# Patient Record
Sex: FEMALE | Race: WHITE | NOT HISPANIC OR LATINO
[De-identification: names, ages, dates, MRNs, and addresses within clinical notes are randomized per-mention and may not be internally consistent; named-entity substitution may affect disease eponyms.]

---

## 2021-01-27 ENCOUNTER — APPOINTMENT (OUTPATIENT)
Dept: ANTEPARTUM | Facility: CLINIC | Age: 31
End: 2021-01-27
Payer: COMMERCIAL

## 2021-01-27 ENCOUNTER — TRANSCRIPTION ENCOUNTER (OUTPATIENT)
Age: 31
End: 2021-01-27

## 2021-01-27 ENCOUNTER — ASOB RESULT (OUTPATIENT)
Age: 31
End: 2021-01-27

## 2021-01-27 PROCEDURE — 99072 ADDL SUPL MATRL&STAF TM PHE: CPT

## 2021-01-27 PROCEDURE — 76801 OB US < 14 WKS SINGLE FETUS: CPT

## 2021-01-27 PROCEDURE — 76813 OB US NUCHAL MEAS 1 GEST: CPT | Mod: 59

## 2021-03-24 ENCOUNTER — ASOB RESULT (OUTPATIENT)
Age: 31
End: 2021-03-24

## 2021-03-24 ENCOUNTER — APPOINTMENT (OUTPATIENT)
Dept: ANTEPARTUM | Facility: CLINIC | Age: 31
End: 2021-03-24
Payer: COMMERCIAL

## 2021-03-24 PROBLEM — Z00.00 ENCOUNTER FOR PREVENTIVE HEALTH EXAMINATION: Status: ACTIVE | Noted: 2021-03-24

## 2021-03-24 PROCEDURE — 76805 OB US >/= 14 WKS SNGL FETUS: CPT

## 2021-03-24 PROCEDURE — 99072 ADDL SUPL MATRL&STAF TM PHE: CPT

## 2021-07-28 ENCOUNTER — OUTPATIENT (OUTPATIENT)
Dept: OUTPATIENT SERVICES | Facility: HOSPITAL | Age: 31
LOS: 1 days | End: 2021-07-28

## 2021-07-28 VITALS
WEIGHT: 177.91 LBS | RESPIRATION RATE: 16 BRPM | DIASTOLIC BLOOD PRESSURE: 70 MMHG | TEMPERATURE: 97 F | SYSTOLIC BLOOD PRESSURE: 100 MMHG | HEART RATE: 83 BPM | HEIGHT: 65 IN

## 2021-07-28 DIAGNOSIS — Z34.90 ENCOUNTER FOR SUPERVISION OF NORMAL PREGNANCY, UNSPECIFIED, UNSPECIFIED TRIMESTER: ICD-10-CM

## 2021-07-28 DIAGNOSIS — Z98.890 OTHER SPECIFIED POSTPROCEDURAL STATES: Chronic | ICD-10-CM

## 2021-07-28 DIAGNOSIS — Z98.891 HISTORY OF UTERINE SCAR FROM PREVIOUS SURGERY: Chronic | ICD-10-CM

## 2021-07-28 LAB
APPEARANCE UR: CLEAR — SIGNIFICANT CHANGE UP
BILIRUB UR-MCNC: NEGATIVE — SIGNIFICANT CHANGE UP
BLD GP AB SCN SERPL QL: NEGATIVE — SIGNIFICANT CHANGE UP
COLOR SPEC: SIGNIFICANT CHANGE UP
DIFF PNL FLD: NEGATIVE — SIGNIFICANT CHANGE UP
GLUCOSE UR QL: NEGATIVE — SIGNIFICANT CHANGE UP
HCT VFR BLD CALC: 35.4 % — SIGNIFICANT CHANGE UP (ref 34.5–45)
HGB BLD-MCNC: 11.3 G/DL — LOW (ref 11.5–15.5)
KETONES UR-MCNC: NEGATIVE — SIGNIFICANT CHANGE UP
LEUKOCYTE ESTERASE UR-ACNC: ABNORMAL
MCHC RBC-ENTMCNC: 25.9 PG — LOW (ref 27–34)
MCHC RBC-ENTMCNC: 31.9 GM/DL — LOW (ref 32–36)
MCV RBC AUTO: 81 FL — SIGNIFICANT CHANGE UP (ref 80–100)
NITRITE UR-MCNC: NEGATIVE — SIGNIFICANT CHANGE UP
NRBC # BLD: 0 /100 WBCS — SIGNIFICANT CHANGE UP
NRBC # FLD: 0 K/UL — SIGNIFICANT CHANGE UP
PH UR: 6.5 — SIGNIFICANT CHANGE UP (ref 5–8)
PLATELET # BLD AUTO: 199 K/UL — SIGNIFICANT CHANGE UP (ref 150–400)
PROT UR-MCNC: ABNORMAL
RBC # BLD: 4.37 M/UL — SIGNIFICANT CHANGE UP (ref 3.8–5.2)
RBC # FLD: 14.7 % — HIGH (ref 10.3–14.5)
RH IG SCN BLD-IMP: POSITIVE — SIGNIFICANT CHANGE UP
SP GR SPEC: 1.02 — SIGNIFICANT CHANGE UP (ref 1.01–1.02)
UROBILINOGEN FLD QL: SIGNIFICANT CHANGE UP
WBC # BLD: 10.04 K/UL — SIGNIFICANT CHANGE UP (ref 3.8–10.5)
WBC # FLD AUTO: 10.04 K/UL — SIGNIFICANT CHANGE UP (ref 3.8–10.5)

## 2021-07-28 RX ORDER — SODIUM CHLORIDE 9 MG/ML
1000 INJECTION, SOLUTION INTRAVENOUS
Refills: 0 | Status: DISCONTINUED | OUTPATIENT
Start: 2021-08-06 | End: 2021-08-06

## 2021-07-28 NOTE — OB PST NOTE - FALL HARM RISK CONCLUSION
Addended by: Ban Jay on: 9/29/2020 10:18 AM     Modules accepted: Orders Universal Safety Interventions

## 2021-07-28 NOTE — OB PST NOTE - NSHPPHYSICALEXAM_GEN_ALL_CORE
Constitutional: Well Developed, Well Groomed, Well Nourished, No Distress    Eyes: PERRL, EOMI, conjunctiva clear    Ears: Normal    Mouth & Gums: Normal, moist    Pharynx: No tenderness, discharge, or peritonsillar abscess    Tonsils: No Redness, discharge, tenderness, or swelling    Neck: Supple, no JVD, normal thyroid glands, no carotid bruits, no cervical vertebral or paraspinal tenderness    Breast: Normal shape, no masses, no tenderness, nipples normal, no nipple discharge    Back: Normal shape, ROM intact, strength intact, no vertebral tenderness    Respiratory: Airway patent, breath sounds equal, good air movement, respiration non-labored, clear to auscultation bilateral, no chest wall tenderness, no intercostal retractions, no rales, no wheezes, no rhonchi, no subcutaneous emphysema    Cardiovascular:  Regular rate and rhythm, no rubs or murmur, normal PMI    Gastrointestinal: Gravid abdomen    Extremities: No clubbing, cyanosis, or pedal edema    Vascular:  Carotid Pulse normal , Radial Pulse normal, Femoral Pulse normal, DP pulse normal, PT pulse normal    Neurological: alert & oriented x 3, sensation intact, deep reflexes intact, cranial nerve intact, normal strength    Skin: warm and dry, normal color    Lymph Nodes: normal posterior cervical lymph node, normal anterior cervical lymph node, normal supraclavicular lymph node, normal axillary lymph node, normal inguinal lymph node, normal femoral lymph node    Musculoskeletal: ROM intact, no joint swelling, warmth, or calf tenderness. Normal strength    Psychiatric: normal affect, normal behavior

## 2021-07-28 NOTE — OB PST NOTE - NSANTHOSAYNRD_GEN_A_CORE
No. JALEEL screening performed.  STOP BANG Legend: 0-2 = LOW Risk; 3-4 = INTERMEDIATE Risk; 5-8 = HIGH Risk

## 2021-07-28 NOTE — OB PST NOTE - PROBLEM SELECTOR PLAN 1
29 yo scheduled for repeat  section with Dr. Mai on 2021.  Preoperative instructions reviewed. Surgical scrub provided. No food after midnight, clear liquids up to 2.5 hours prior to procedure. Pending labs Type & screen, CBC, RPR, UA.   Patient given verbal and written instruction with teach back on chlorhexidine shampoo, and the patient verbalized understanding with return demonstration.   Patient given verbal and written instruction with teach back on chlorhexidine shampoo, and the patient verbalized understanding with return demonstration.   Patient aware of need for COVID testing prior to  procedure at a [facility]  and advised to coordinate with OB/GYN to get tested within 72 hours of procedure.

## 2021-07-28 NOTE — OB PST NOTE - HISTORY OF PRESENT ILLNESS
31 yo female presents to PST unit scheduled for repeat  section with Dr. Mai. TIFFANI 2021. Pt. reports positive fetal movement, denies loss of fluid, vaginal bleeding or painful uterine contractions.

## 2021-08-03 ENCOUNTER — APPOINTMENT (OUTPATIENT)
Dept: DISASTER EMERGENCY | Facility: CLINIC | Age: 31
End: 2021-08-03

## 2021-08-03 DIAGNOSIS — Z01.818 ENCOUNTER FOR OTHER PREPROCEDURAL EXAMINATION: ICD-10-CM

## 2021-08-05 ENCOUNTER — TRANSCRIPTION ENCOUNTER (OUTPATIENT)
Age: 31
End: 2021-08-05

## 2021-08-06 ENCOUNTER — INPATIENT (INPATIENT)
Facility: HOSPITAL | Age: 31
LOS: 2 days | Discharge: HOME | End: 2021-08-09
Attending: STUDENT IN AN ORGANIZED HEALTH CARE EDUCATION/TRAINING PROGRAM | Admitting: STUDENT IN AN ORGANIZED HEALTH CARE EDUCATION/TRAINING PROGRAM
Payer: COMMERCIAL

## 2021-08-06 ENCOUNTER — TRANSCRIPTION ENCOUNTER (OUTPATIENT)
Age: 31
End: 2021-08-06

## 2021-08-06 VITALS
HEART RATE: 85 BPM | HEIGHT: 65 IN | TEMPERATURE: 98 F | WEIGHT: 179.9 LBS | DIASTOLIC BLOOD PRESSURE: 81 MMHG | RESPIRATION RATE: 14 BRPM | SYSTOLIC BLOOD PRESSURE: 134 MMHG

## 2021-08-06 DIAGNOSIS — Z98.890 OTHER SPECIFIED POSTPROCEDURAL STATES: Chronic | ICD-10-CM

## 2021-08-06 DIAGNOSIS — Z98.891 HISTORY OF UTERINE SCAR FROM PREVIOUS SURGERY: Chronic | ICD-10-CM

## 2021-08-06 DIAGNOSIS — Z98.891 HISTORY OF UTERINE SCAR FROM PREVIOUS SURGERY: ICD-10-CM

## 2021-08-06 LAB
BASOPHILS # BLD AUTO: 0.03 K/UL — SIGNIFICANT CHANGE UP (ref 0–0.2)
BASOPHILS # BLD AUTO: 0.03 K/UL — SIGNIFICANT CHANGE UP (ref 0–0.2)
BASOPHILS NFR BLD AUTO: 0.2 % — SIGNIFICANT CHANGE UP (ref 0–2)
BASOPHILS NFR BLD AUTO: 0.4 % — SIGNIFICANT CHANGE UP (ref 0–2)
BLD GP AB SCN SERPL QL: NEGATIVE — SIGNIFICANT CHANGE UP
COVID-19 SPIKE DOMAIN AB INTERP: POSITIVE
COVID-19 SPIKE DOMAIN ANTIBODY RESULT: 49 U/ML — HIGH
EOSINOPHIL # BLD AUTO: 0.01 K/UL — SIGNIFICANT CHANGE UP (ref 0–0.5)
EOSINOPHIL # BLD AUTO: 0.17 K/UL — SIGNIFICANT CHANGE UP (ref 0–0.5)
EOSINOPHIL NFR BLD AUTO: 0.1 % — SIGNIFICANT CHANGE UP (ref 0–6)
EOSINOPHIL NFR BLD AUTO: 2.4 % — SIGNIFICANT CHANGE UP (ref 0–6)
HCT VFR BLD CALC: 29.4 % — LOW (ref 34.5–45)
HCT VFR BLD CALC: 36 % — SIGNIFICANT CHANGE UP (ref 34.5–45)
HGB BLD-MCNC: 11.6 G/DL — SIGNIFICANT CHANGE UP (ref 11.5–15.5)
HGB BLD-MCNC: 9.5 G/DL — LOW (ref 11.5–15.5)
IANC: 14.2 K/UL — HIGH (ref 1.5–8.5)
IANC: 4.53 K/UL — SIGNIFICANT CHANGE UP (ref 1.5–8.5)
IMM GRANULOCYTES NFR BLD AUTO: 0.4 % — SIGNIFICANT CHANGE UP (ref 0–1.5)
IMM GRANULOCYTES NFR BLD AUTO: 0.5 % — SIGNIFICANT CHANGE UP (ref 0–1.5)
LYMPHOCYTES # BLD AUTO: 0.79 K/UL — LOW (ref 1–3.3)
LYMPHOCYTES # BLD AUTO: 1.53 K/UL — SIGNIFICANT CHANGE UP (ref 1–3.3)
LYMPHOCYTES # BLD AUTO: 21.7 % — SIGNIFICANT CHANGE UP (ref 13–44)
LYMPHOCYTES # BLD AUTO: 5 % — LOW (ref 13–44)
MCHC RBC-ENTMCNC: 25.6 PG — LOW (ref 27–34)
MCHC RBC-ENTMCNC: 26 PG — LOW (ref 27–34)
MCHC RBC-ENTMCNC: 32.2 GM/DL — SIGNIFICANT CHANGE UP (ref 32–36)
MCHC RBC-ENTMCNC: 32.3 GM/DL — SIGNIFICANT CHANGE UP (ref 32–36)
MCV RBC AUTO: 79.3 FL — LOW (ref 80–100)
MCV RBC AUTO: 80.3 FL — SIGNIFICANT CHANGE UP (ref 80–100)
MONOCYTES # BLD AUTO: 0.75 K/UL — SIGNIFICANT CHANGE UP (ref 0–0.9)
MONOCYTES # BLD AUTO: 0.82 K/UL — SIGNIFICANT CHANGE UP (ref 0–0.9)
MONOCYTES NFR BLD AUTO: 10.7 % — SIGNIFICANT CHANGE UP (ref 2–14)
MONOCYTES NFR BLD AUTO: 5.1 % — SIGNIFICANT CHANGE UP (ref 2–14)
NEUTROPHILS # BLD AUTO: 14.2 K/UL — HIGH (ref 1.8–7.4)
NEUTROPHILS # BLD AUTO: 4.53 K/UL — SIGNIFICANT CHANGE UP (ref 1.8–7.4)
NEUTROPHILS NFR BLD AUTO: 64.4 % — SIGNIFICANT CHANGE UP (ref 43–77)
NEUTROPHILS NFR BLD AUTO: 89.1 % — HIGH (ref 43–77)
NRBC # BLD: 0 /100 WBCS — SIGNIFICANT CHANGE UP
NRBC # BLD: 0 /100 WBCS — SIGNIFICANT CHANGE UP
NRBC # FLD: 0 K/UL — SIGNIFICANT CHANGE UP
NRBC # FLD: 0 K/UL — SIGNIFICANT CHANGE UP
PLATELET # BLD AUTO: 190 K/UL — SIGNIFICANT CHANGE UP (ref 150–400)
PLATELET # BLD AUTO: 242 K/UL — SIGNIFICANT CHANGE UP (ref 150–400)
RBC # BLD: 3.66 M/UL — LOW (ref 3.8–5.2)
RBC # BLD: 4.54 M/UL — SIGNIFICANT CHANGE UP (ref 3.8–5.2)
RBC # FLD: 14.8 % — HIGH (ref 10.3–14.5)
RBC # FLD: 14.8 % — HIGH (ref 10.3–14.5)
RH IG SCN BLD-IMP: POSITIVE — SIGNIFICANT CHANGE UP
SARS-COV-2 IGG+IGM SERPL QL IA: 49 U/ML — HIGH
SARS-COV-2 IGG+IGM SERPL QL IA: POSITIVE
T PALLIDUM AB TITR SER: NEGATIVE — SIGNIFICANT CHANGE UP
WBC # BLD: 15.93 K/UL — HIGH (ref 3.8–10.5)
WBC # BLD: 7.04 K/UL — SIGNIFICANT CHANGE UP (ref 3.8–10.5)
WBC # FLD AUTO: 15.93 K/UL — HIGH (ref 3.8–10.5)
WBC # FLD AUTO: 7.04 K/UL — SIGNIFICANT CHANGE UP (ref 3.8–10.5)

## 2021-08-06 PROCEDURE — 59514 CESAREAN DELIVERY ONLY: CPT | Mod: AS,U9

## 2021-08-06 PROCEDURE — 59025 FETAL NON-STRESS TEST: CPT | Mod: 26

## 2021-08-06 RX ORDER — MAGNESIUM HYDROXIDE 400 MG/1
30 TABLET, CHEWABLE ORAL
Refills: 0 | Status: DISCONTINUED | OUTPATIENT
Start: 2021-08-06 | End: 2021-08-09

## 2021-08-06 RX ORDER — ACETAMINOPHEN 500 MG
1000 TABLET ORAL ONCE
Refills: 0 | Status: COMPLETED | OUTPATIENT
Start: 2021-08-06 | End: 2021-08-06

## 2021-08-06 RX ORDER — NALBUPHINE HYDROCHLORIDE 10 MG/ML
2.5 INJECTION, SOLUTION INTRAMUSCULAR; INTRAVENOUS; SUBCUTANEOUS EVERY 6 HOURS
Refills: 0 | Status: DISCONTINUED | OUTPATIENT
Start: 2021-08-06 | End: 2021-08-08

## 2021-08-06 RX ORDER — SIMETHICONE 80 MG/1
80 TABLET, CHEWABLE ORAL EVERY 4 HOURS
Refills: 0 | Status: DISCONTINUED | OUTPATIENT
Start: 2021-08-06 | End: 2021-08-09

## 2021-08-06 RX ORDER — ASPIRIN/CALCIUM CARB/MAGNESIUM 324 MG
1 TABLET ORAL
Qty: 0 | Refills: 0 | DISCHARGE

## 2021-08-06 RX ORDER — OXYTOCIN 10 UNIT/ML
333.33 VIAL (ML) INJECTION
Qty: 20 | Refills: 0 | Status: DISCONTINUED | OUTPATIENT
Start: 2021-08-06 | End: 2021-08-06

## 2021-08-06 RX ORDER — OXYCODONE HYDROCHLORIDE 5 MG/1
5 TABLET ORAL
Refills: 0 | Status: DISCONTINUED | OUTPATIENT
Start: 2021-08-06 | End: 2021-08-07

## 2021-08-06 RX ORDER — SODIUM CHLORIDE 9 MG/ML
1000 INJECTION, SOLUTION INTRAVENOUS
Refills: 0 | Status: DISCONTINUED | OUTPATIENT
Start: 2021-08-06 | End: 2021-08-06

## 2021-08-06 RX ORDER — DIPHENHYDRAMINE HCL 50 MG
25 CAPSULE ORAL EVERY 6 HOURS
Refills: 0 | Status: DISCONTINUED | OUTPATIENT
Start: 2021-08-06 | End: 2021-08-09

## 2021-08-06 RX ORDER — NALOXONE HYDROCHLORIDE 4 MG/.1ML
0.1 SPRAY NASAL
Refills: 0 | Status: DISCONTINUED | OUTPATIENT
Start: 2021-08-06 | End: 2021-08-08

## 2021-08-06 RX ORDER — TETANUS TOXOID, REDUCED DIPHTHERIA TOXOID AND ACELLULAR PERTUSSIS VACCINE, ADSORBED 5; 2.5; 8; 8; 2.5 [IU]/.5ML; [IU]/.5ML; UG/.5ML; UG/.5ML; UG/.5ML
0.5 SUSPENSION INTRAMUSCULAR ONCE
Refills: 0 | Status: DISCONTINUED | OUTPATIENT
Start: 2021-08-06 | End: 2021-08-09

## 2021-08-06 RX ORDER — HYDROMORPHONE HYDROCHLORIDE 2 MG/ML
1 INJECTION INTRAMUSCULAR; INTRAVENOUS; SUBCUTANEOUS
Refills: 0 | Status: DISCONTINUED | OUTPATIENT
Start: 2021-08-06 | End: 2021-08-06

## 2021-08-06 RX ORDER — FERROUS SULFATE 325(65) MG
325 TABLET ORAL DAILY
Refills: 0 | Status: DISCONTINUED | OUTPATIENT
Start: 2021-08-06 | End: 2021-08-08

## 2021-08-06 RX ORDER — BUPIVACAINE 13.3 MG/ML
20 INJECTION, SUSPENSION, LIPOSOMAL INFILTRATION ONCE
Refills: 0 | Status: DISCONTINUED | OUTPATIENT
Start: 2021-08-06 | End: 2021-08-08

## 2021-08-06 RX ORDER — OXYCODONE HYDROCHLORIDE 5 MG/1
10 TABLET ORAL
Refills: 0 | Status: DISCONTINUED | OUTPATIENT
Start: 2021-08-06 | End: 2021-08-06

## 2021-08-06 RX ORDER — TRANEXAMIC ACID 100 MG/ML
1000 INJECTION, SOLUTION INTRAVENOUS ONCE
Refills: 0 | Status: DISCONTINUED | OUTPATIENT
Start: 2021-08-06 | End: 2021-08-09

## 2021-08-06 RX ORDER — TRANEXAMIC ACID 100 MG/ML
1000 INJECTION, SOLUTION INTRAVENOUS ONCE
Refills: 0 | Status: DISCONTINUED | OUTPATIENT
Start: 2021-08-06 | End: 2021-08-06

## 2021-08-06 RX ORDER — SENNA PLUS 8.6 MG/1
1 TABLET ORAL
Refills: 0 | Status: DISCONTINUED | OUTPATIENT
Start: 2021-08-06 | End: 2021-08-09

## 2021-08-06 RX ORDER — LANOLIN
1 OINTMENT (GRAM) TOPICAL EVERY 6 HOURS
Refills: 0 | Status: DISCONTINUED | OUTPATIENT
Start: 2021-08-06 | End: 2021-08-09

## 2021-08-06 RX ORDER — DIPHENOXYLATE HCL/ATROPINE 2.5-.025MG
2 TABLET ORAL ONCE
Refills: 0 | Status: DISCONTINUED | OUTPATIENT
Start: 2021-08-06 | End: 2021-08-09

## 2021-08-06 RX ORDER — SODIUM CHLORIDE 9 MG/ML
1000 INJECTION, SOLUTION INTRAVENOUS ONCE
Refills: 0 | Status: COMPLETED | OUTPATIENT
Start: 2021-08-06 | End: 2021-08-06

## 2021-08-06 RX ORDER — CITRIC ACID/SODIUM CITRATE 300-500 MG
30 SOLUTION, ORAL ORAL ONCE
Refills: 0 | Status: COMPLETED | OUTPATIENT
Start: 2021-08-06 | End: 2021-08-06

## 2021-08-06 RX ORDER — ACETAMINOPHEN 500 MG
975 TABLET ORAL EVERY 6 HOURS
Refills: 0 | Status: COMPLETED | OUTPATIENT
Start: 2021-08-06 | End: 2022-07-05

## 2021-08-06 RX ORDER — ACETAMINOPHEN 500 MG
1000 TABLET ORAL EVERY 6 HOURS
Refills: 0 | Status: COMPLETED | OUTPATIENT
Start: 2021-08-06 | End: 2021-08-07

## 2021-08-06 RX ORDER — BUPIVACAINE HCL/PF 7.5 MG/ML
20 VIAL (ML) INJECTION ONCE
Refills: 0 | Status: DISCONTINUED | OUTPATIENT
Start: 2021-08-06 | End: 2021-08-08

## 2021-08-06 RX ORDER — HEPARIN SODIUM 5000 [USP'U]/ML
5000 INJECTION INTRAVENOUS; SUBCUTANEOUS EVERY 12 HOURS
Refills: 0 | Status: DISCONTINUED | OUTPATIENT
Start: 2021-08-06 | End: 2021-08-09

## 2021-08-06 RX ORDER — SIMETHICONE 80 MG/1
80 TABLET, CHEWABLE ORAL EVERY 4 HOURS
Refills: 0 | Status: DISCONTINUED | OUTPATIENT
Start: 2021-08-06 | End: 2021-08-06

## 2021-08-06 RX ORDER — OXYCODONE HYDROCHLORIDE 5 MG/1
5 TABLET ORAL ONCE
Refills: 0 | Status: DISCONTINUED | OUTPATIENT
Start: 2021-08-06 | End: 2021-08-09

## 2021-08-06 RX ORDER — FAMOTIDINE 10 MG/ML
20 INJECTION INTRAVENOUS ONCE
Refills: 0 | Status: COMPLETED | OUTPATIENT
Start: 2021-08-06 | End: 2021-08-06

## 2021-08-06 RX ORDER — ONDANSETRON 8 MG/1
4 TABLET, FILM COATED ORAL EVERY 6 HOURS
Refills: 0 | Status: DISCONTINUED | OUTPATIENT
Start: 2021-08-06 | End: 2021-08-08

## 2021-08-06 RX ORDER — OXYCODONE HYDROCHLORIDE 5 MG/1
5 TABLET ORAL
Refills: 0 | Status: COMPLETED | OUTPATIENT
Start: 2021-08-06 | End: 2021-08-13

## 2021-08-06 RX ADMIN — Medication 30 MILLILITER(S): at 07:08

## 2021-08-06 RX ADMIN — HEPARIN SODIUM 5000 UNIT(S): 5000 INJECTION INTRAVENOUS; SUBCUTANEOUS at 15:40

## 2021-08-06 RX ADMIN — Medication 400 MILLIGRAM(S): at 20:40

## 2021-08-06 RX ADMIN — FAMOTIDINE 20 MILLIGRAM(S): 10 INJECTION INTRAVENOUS at 07:07

## 2021-08-06 RX ADMIN — Medication 1000 MILLIGRAM(S): at 16:10

## 2021-08-06 RX ADMIN — Medication 325 MILLIGRAM(S): at 20:40

## 2021-08-06 RX ADMIN — Medication 400 MILLIGRAM(S): at 15:41

## 2021-08-06 RX ADMIN — SODIUM CHLORIDE 125 MILLILITER(S): 9 INJECTION, SOLUTION INTRAVENOUS at 07:08

## 2021-08-06 RX ADMIN — Medication 1000 MILLIGRAM(S): at 21:30

## 2021-08-06 RX ADMIN — SODIUM CHLORIDE 2000 MILLILITER(S): 9 INJECTION, SOLUTION INTRAVENOUS at 07:08

## 2021-08-06 NOTE — OB PROVIDER H&P - HISTORY OF PRESENT ILLNESS
29 yo female presents to PST unit scheduled for repeat  section with Dr. Mai. TIFFANI 2021. Pt. reports positive fetal movement, denies loss of fluid, vaginal bleeding or painful uterine contractions.

## 2021-08-06 NOTE — OB RN PATIENT PROFILE - NS_OBGYNHISTORY_OBGYN_ALL_OB_FT
2018, scheduled  section for LGA, [id number], gest HTN, intrapartum hemorrhage, transfused 2 Units PRBC

## 2021-08-06 NOTE — DISCHARGE NOTE OB - CARE PLAN
Principal Discharge DX:	Previous  section  Goal:	Recovery  Assessment and plan of treatment:	PO Care

## 2021-08-06 NOTE — DISCHARGE NOTE OB - CONDITION (STATED IN TERMS THAT PERMIT A SPECIFIC MEASURABLE COMPARISON WITH CONDITION ON ADMISSION):
Anus position normal and patency confirmed, rectal-cutaneous fistula absent, normal anal wink.
Stable

## 2021-08-06 NOTE — BRIEF OPERATIVE NOTE - OPERATION/FINDINGS
Viable male infant, apgar 9/9, weight 3980 gms  delivered @08:05  cephalic presentation, clear copious fluid, noted  lower uterine segment is thinned out and bulging  hysterotomy closed in single layer   TXA was administered at the start of the case by anesthesia due to history of PPH during previous delivery   Methergine, Pitocin and Hemabate administered for mild uterine atony  peritoneum and rectus layers reapproximated, fascia layer reapproximated  otherwise grossly normal uterus, tubes & ovaries    QBL: 1205 cc  UOP: 400 cc  IVF: 3000 cc  Dictation Number: [id number]

## 2021-08-06 NOTE — DISCHARGE NOTE OB - CARE PROVIDER_API CALL
Gregg Crespo)  Obstetrics and Gynecology  [address] [place] [address]  Phone: [phone]  Fax: [phone]  Follow Up Time:

## 2021-08-06 NOTE — OB PROVIDER DELIVERY SUMMARY - NSSELHIDDEN_OBGYN_ALL_OB_FT
[NS_DeliveryAttending1_OBGYN_ALL_OB_FT:[id number]] [NS_DeliveryAttending1_OBGYN_ALL_OB_FT:[id number]],[NS_DeliveryAssist1_OBGYN_ALL_OB_FT:[id number]==]

## 2021-08-06 NOTE — BRIEF OPERATIVE NOTE - NSICDXBRIEFPOSTOP_GEN_ALL_CORE_FT
POST-OP DIAGNOSIS:  Delivery by  section using transverse incision of lower segment of uterus 06-Aug-2021 09:51:29  Sruthi Israel

## 2021-08-06 NOTE — DISCHARGE NOTE OB - PRINCIPAL DIAGNOSIS
- on metoprolol   - continue meds with hold parameters (see below)  - monitor BP closely   - continue midodrine prior to HD sessions   - will give albumin 100ml  - Cardio: Dr. Arteaga  - hold Cardizem drip due to episodes of hypotension during dialysis  - Resume Metoprolol Tartrate 25 mg BID and hold AM dose on HD days (MTThS) Previous  section

## 2021-08-06 NOTE — OB PROVIDER TRIAGE NOTE - NS_OBGYNHISTORY_OBGYN_ALL_OB_FT
2018, scheduled  section for LGA, [id number], gest HTN, intrapartum hemorrhage, transfused 2 Units PRBC

## 2021-08-06 NOTE — OB PROVIDER H&P - NS_OBGYNHISTORY_OBGYN_ALL_OB_FT
2018, scheduled  section for LGA, [id number], gest HTN, intrapartum hemorrhage, transfused 2 Units PRBC

## 2021-08-06 NOTE — OB RN INTRAOPERATIVE NOTE - NSSELHIDDEN_OBGYN_ALL_OB_FT
[NS_DeliveryAttending1_OBGYN_ALL_OB_FT:[id number]],[NS_DeliveryAssist1_OBGYN_ALL_OB_FT:[id number]]

## 2021-08-06 NOTE — OB RN DELIVERY SUMMARY - NSSELHIDDEN_OBGYN_ALL_OB_FT
[NS_DeliveryAttending1_OBGYN_ALL_OB_FT:[id number]],[NS_DeliveryAssist1_OBGYN_ALL_OB_FT:[id number]==]

## 2021-08-06 NOTE — DISCHARGE NOTE OB - MEDICATION SUMMARY - MEDICATIONS TO TAKE
I will START or STAY ON the medications listed below when I get home from the hospital:  None I will START or STAY ON the medications listed below when I get home from the hospital:    acetaminophen 325 mg oral tablet  -- 3 tab(s) by mouth every 6 hours  -- Indication: For PAIN     lanolin topical ointment  -- 1 application on skin every 6 hours, As needed, Sore Nipples  -- Indication: For nipple discomfort     Prenatal Multivitamins with Folic Acid 1 mg oral tablet  -- 1 tab(s) by mouth once a day  -- Indication: For nutrition     simethicone 80 mg oral tablet, chewable  -- 1 tab(s) by mouth every 4 hours, As needed, Gas  -- Indication: For gas    I will START or STAY ON the medications listed below when I get home from the hospital:    acetaminophen 325 mg oral tablet  -- 3 tab(s) by mouth every 6 hours  -- Indication: For PAIN     lanolin topical ointment  -- 1 application on skin every 6 hours, As needed, Sore Nipples  -- Indication: For nipple discomfort     ferrous sulfate 325 mg (65 mg elemental iron) oral tablet  -- 1 tab(s) by mouth 3 times a day  -- Indication: For Anemia    Prenatal Multivitamins with Folic Acid 1 mg oral tablet  -- 1 tab(s) by mouth once a day  -- Indication: For nutrition     simethicone 80 mg oral tablet, chewable  -- 1 tab(s) by mouth every 4 hours, As needed, Gas  -- Indication: For gas     ascorbic acid 500 mg oral tablet  -- 1 tab(s) by mouth once a day  -- Indication: For Anemia

## 2021-08-06 NOTE — DISCHARGE NOTE OB - MATERIALS PROVIDED
Vaccinations/  Immunization Record/Guide to Postpartum Care/Shaken Baby Prevention Handout/Breastfeeding Guide and Packet/Birth Certificate Instructions/Discharge Medication Information for Patients and Families Pocket Guide Vaccinations/[facility]  Screening Program/  Immunization Record/Breastfeeding Log/Breastfeeding Mother’s Support Group Information/Guide to Postpartum Care/[facility] Hearing Screen Program/Shaken Baby Prevention Handout/Breastfeeding Guide and Packet/Birth Certificate Instructions/Discharge Medication Information for Patients and Families Pocket Guide

## 2021-08-06 NOTE — DISCHARGE NOTE OB - PATIENT PORTAL LINK FT
You can access the FollowMyHealth Patient Portal offered by [facility] by registering at the following website: [email or web address]. By joining [employer]’s FollowMyHealth portal, you will also be able to view your health information using other applications (apps) compatible with our system.

## 2021-08-06 NOTE — OB PROVIDER H&P - NS_PREOPBLOODCONS_OBGYN_ALL_OB
----- Message from Marta Alvin sent at 11/3/2020 10:53 AM EST -----  Subject: Message to Provider    QUESTIONS  Information for Provider? Pt called in and wanted to know if he could have   an upper and lower colonoscopy completed   pts insurance will cover both but he only has order for lower colonoscopy   please advise and follow up with pt  ---------------------------------------------------------------------------  --------------  CALL BACK INFO  What is the best way for the office to contact you? OK to leave message on   voicemail  Preferred Call Back Phone Number? [phone]  ---------------------------------------------------------------------------  --------------  SCRIPT ANSWERS  Relationship to Patient?  Self n/a

## 2021-08-06 NOTE — OB PROVIDER H&P - ASSESSMENT
29 yo , EGA@39.2 weeks, presented for scheduled repeat  section.  Patient denies contractions, denies vaginal bleeding, leakage of fluid, and reports fetal movement.  Denies fever, chills, headaches, changes in vision, chest pain, palpitations, shortness of breath, cough, nausea, vomiting, diarrhea, constipation, urinary symptoms, edema.    Prenatal care with Dr Mai.   Prenatal course is uncomplicated.    GBS status is negative.  No adverse reactions to anesthesia, no objections to blood transfusions if clinically indicated.  OB hx: 2018, scheduled  section for LGA, [id number], gest HTN, intrapartum hemorrhage, transfused 2 Units PRBC  Med hx: asthma as a child, chronic bronchitis  Surg hx: 2-18,  section; , deviated septum repair  GYN hx: denies hx of abnormal Papsmear/fibroids/cysts/STDs  Meds: Prenatal vitamins qd, ASA 81 mg, qd; FeSO4 BID  Allergies:  Penicillin (hives)    Social hx: Denies alcohol, tobacco, drug use    PHYSICAL EXAM  Vital Signs Last 24 Hrs  T(C): --  T(F): --  HR: --  BP: --  BP(mean): --  RR: --  SpO2: --    Gen: NAD  Head: NC/AT  Cardio: S1S2+, RRR  Resp: CTABL, no wheezing  Abdomen: Soft, NT/ND, BS+  Extremities: No LE edema bilaterally    NST is in progress     A:  29 yo , EGA@39.2  weeks, scheduled for repeat  section     Plan: admit for repeat  section;     Sruthi Israel CNM     21 @ 06:16     31 yo , EGA@39.2 weeks, presented for scheduled repeat  section.  Patient denies contractions, denies vaginal bleeding, leakage of fluid, and reports fetal movement.  Denies fever, chills, headaches, changes in vision, chest pain, palpitations, shortness of breath, cough, nausea, vomiting, diarrhea, constipation, urinary symptoms, edema.    Prenatal care with Dr Mai.   Prenatal course is uncomplicated.    GBS status is negative.  No adverse reactions to anesthesia, no objections to blood transfusions if clinically indicated.  OB hx: 2018, scheduled  section for LGA, [id number], gest HTN, intrapartum hemorrhage, transfused 2 Units PRBC  Med hx: asthma as a child, chronic bronchitis  Surg hx: 2-18,  section; , deviated septum repair  GYN hx: denies hx of abnormal Papsmear/fibroids/cysts/STDs  Meds: Prenatal vitamins qd, ASA 81 mg, qd; FeSO4 BID  Allergies:  Penicillin (hives)    Social hx: Denies alcohol, tobacco, drug use    PHYSICAL EXAM  Vital Signs Last 24 Hrs  T(C): 36.7C  HR: 83 bpm  BP: 134/81  RR: 16    Gen: NAD  Head: NC/AT  Cardio: S1S2+, RRR  Resp: CTABL, no wheezing  Abdomen: Soft, NT/ND, BS+  Extremities: No LE edema bilaterally    NST is in progress     A:  31 yo , EGA@39.2  weeks, scheduled for repeat  section     Plan: admit for repeat  section;     Sruthi Israel CNM     21 @ 06:16

## 2021-08-06 NOTE — OB PROVIDER DELIVERY SUMMARY - NSPPHRISKEVAL_OBGYN_ALL_OB
History of PPH requiring transfusion History of PPH requiring transfusion/Prior , uterine surgery, or multiple laparotomies

## 2021-08-06 NOTE — OB PROVIDER DELIVERY SUMMARY - NSPROVIDERDELIVERYNOTE_OBGYN_ALL_OB_FT
Viable male infant, apgar 9/9, weight 3980 gms  delivered @08:05  cephalic presentation, clear copious fluid, noted  lower uterine segment is thinned out and bulging  hysterotomy closed in single layer   TXA was administered at the start of the case by anesthesia due to history of PPH during previous delivery   Methergine, Pitocin and Hemabate administered for mild uterine atony  peritoneum and rectus layers reapproximated, fascia layer reapproximated  otherwise grossly normal uterus, tubes & ovaries    QBL: 1205 cc  UOP: 400 cc  IVF: 3000 cc  Dictation Number: [id number]

## 2021-08-07 LAB
BASOPHILS # BLD AUTO: 0.03 K/UL — SIGNIFICANT CHANGE UP (ref 0–0.2)
BASOPHILS NFR BLD AUTO: 0.2 % — SIGNIFICANT CHANGE UP (ref 0–2)
EOSINOPHIL # BLD AUTO: 0.06 K/UL — SIGNIFICANT CHANGE UP (ref 0–0.5)
EOSINOPHIL NFR BLD AUTO: 0.4 % — SIGNIFICANT CHANGE UP (ref 0–6)
HCT VFR BLD CALC: 25.6 % — LOW (ref 34.5–45)
HGB BLD-MCNC: 8.3 G/DL — LOW (ref 11.5–15.5)
IANC: 11.35 K/UL — HIGH (ref 1.5–8.5)
IMM GRANULOCYTES NFR BLD AUTO: 0.4 % — SIGNIFICANT CHANGE UP (ref 0–1.5)
LYMPHOCYTES # BLD AUTO: 1.37 K/UL — SIGNIFICANT CHANGE UP (ref 1–3.3)
LYMPHOCYTES # BLD AUTO: 9.7 % — LOW (ref 13–44)
MCHC RBC-ENTMCNC: 26.2 PG — LOW (ref 27–34)
MCHC RBC-ENTMCNC: 32.4 GM/DL — SIGNIFICANT CHANGE UP (ref 32–36)
MCV RBC AUTO: 80.8 FL — SIGNIFICANT CHANGE UP (ref 80–100)
MONOCYTES # BLD AUTO: 1.22 K/UL — HIGH (ref 0–0.9)
MONOCYTES NFR BLD AUTO: 8.7 % — SIGNIFICANT CHANGE UP (ref 2–14)
NEUTROPHILS # BLD AUTO: 11.35 K/UL — HIGH (ref 1.8–7.4)
NEUTROPHILS NFR BLD AUTO: 80.6 % — HIGH (ref 43–77)
NRBC # BLD: 0 /100 WBCS — SIGNIFICANT CHANGE UP
NRBC # FLD: 0 K/UL — SIGNIFICANT CHANGE UP
PLATELET # BLD AUTO: 192 K/UL — SIGNIFICANT CHANGE UP (ref 150–400)
RBC # BLD: 3.17 M/UL — LOW (ref 3.8–5.2)
RBC # FLD: 15.1 % — HIGH (ref 10.3–14.5)
WBC # BLD: 14.08 K/UL — HIGH (ref 3.8–10.5)
WBC # FLD AUTO: 14.08 K/UL — HIGH (ref 3.8–10.5)

## 2021-08-07 RX ORDER — OXYCODONE HYDROCHLORIDE 5 MG/1
5 TABLET ORAL
Refills: 0 | Status: DISCONTINUED | OUTPATIENT
Start: 2021-08-07 | End: 2021-08-09

## 2021-08-07 RX ORDER — LANOLIN
1 OINTMENT (GRAM) TOPICAL
Qty: 0 | Refills: 0 | DISCHARGE
Start: 2021-08-07

## 2021-08-07 RX ORDER — ACETAMINOPHEN 500 MG
3 TABLET ORAL
Qty: 0 | Refills: 0 | DISCHARGE
Start: 2021-08-07

## 2021-08-07 RX ORDER — ACETAMINOPHEN 500 MG
100 TABLET ORAL
Qty: 0 | Refills: 0 | DISCHARGE
Start: 2021-08-07

## 2021-08-07 RX ORDER — SIMETHICONE 80 MG/1
1 TABLET, CHEWABLE ORAL
Qty: 0 | Refills: 0 | DISCHARGE
Start: 2021-08-07

## 2021-08-07 RX ORDER — ACETAMINOPHEN 500 MG
975 TABLET ORAL EVERY 6 HOURS
Refills: 0 | Status: DISCONTINUED | OUTPATIENT
Start: 2021-08-07 | End: 2021-08-08

## 2021-08-07 RX ADMIN — Medication 400 MILLIGRAM(S): at 11:56

## 2021-08-07 RX ADMIN — Medication 1000 MILLIGRAM(S): at 12:51

## 2021-08-07 RX ADMIN — Medication 975 MILLIGRAM(S): at 18:39

## 2021-08-07 RX ADMIN — OXYCODONE HYDROCHLORIDE 5 MILLIGRAM(S): 5 TABLET ORAL at 08:33

## 2021-08-07 RX ADMIN — Medication 400 MILLIGRAM(S): at 04:31

## 2021-08-07 RX ADMIN — HEPARIN SODIUM 5000 UNIT(S): 5000 INJECTION INTRAVENOUS; SUBCUTANEOUS at 18:39

## 2021-08-07 RX ADMIN — OXYCODONE HYDROCHLORIDE 5 MILLIGRAM(S): 5 TABLET ORAL at 14:36

## 2021-08-07 RX ADMIN — Medication 1000 MILLIGRAM(S): at 05:26

## 2021-08-07 RX ADMIN — HEPARIN SODIUM 5000 UNIT(S): 5000 INJECTION INTRAVENOUS; SUBCUTANEOUS at 04:31

## 2021-08-07 RX ADMIN — OXYCODONE HYDROCHLORIDE 5 MILLIGRAM(S): 5 TABLET ORAL at 23:57

## 2021-08-07 RX ADMIN — Medication 325 MILLIGRAM(S): at 11:56

## 2021-08-07 RX ADMIN — OXYCODONE HYDROCHLORIDE 5 MILLIGRAM(S): 5 TABLET ORAL at 15:52

## 2021-08-07 RX ADMIN — OXYCODONE HYDROCHLORIDE 5 MILLIGRAM(S): 5 TABLET ORAL at 09:34

## 2021-08-07 RX ADMIN — Medication 1 TABLET(S): at 11:56

## 2021-08-07 NOTE — PROGRESS NOTE ADULT - ASSESSMENT
A/P: 29yo POD#1 s/p LTCS.  Patient is stable and doing well post-operatively.    - Continue regular diet.  - Monitor for flatus   - Increase ambulation.  - Continue motrin, tylenol, oxycodone PRN for pain control  - F/u AM CBC    WILLIE Hinds MD  PGY-1

## 2021-08-07 NOTE — PROGRESS NOTE ADULT - ASSESSMENT
A/P: 29yo POD#1 s/p LTCS.  Patient is stable and doing well post-operatively.    - Continue regular diet.  - Increase ambulation.  - Continue motrin, tylenol, oxycodone PRN for pain control.  vs. continue PCEA for pain.  - F/u AM CBC    Harpreet Trevino, PGY-1 A/P: 29yo POD#1 s/p LTCS.  Patient is stable and doing well post-operatively.    - Continue regular diet.  - Increase ambulation.  - Continue motrin, tylenol, oxycodone PRN for pain control.   - F/u AM CBC    Harpreet Trevino, PGY-1 A/P: 31yo POD#1 s/p LTCS c/b atony requiring Methergine, Hemabate, and pre-op TXA.  Patient is stable and doing well post-operatively.    - Continue regular diet.  - Increase ambulation.  - Continue motrin, tylenol, oxycodone PRN for pain control.   - F/u AM CBC    Harpreet Trevino, PGY-1

## 2021-08-07 NOTE — PROGRESS NOTE ADULT - SUBJECTIVE AND OBJECTIVE BOX
OB Progress Note:  Delivery, POD#1    S: 31yo POD#1 s/p LTCS . Her pain is well controlled. She is tolerating a regular diet. Has not passed gas. Abdomen soft. Denies N/V. Denies CP/SOB/lightheadedness/dizziness.  She is ambulating without difficulty.  Voiding spontaneously     O:   Vital Signs Last 24 Hrs  T(C): 37.1 (07 Aug 2021 06:00), Max: 37.2 (06 Aug 2021 09:05)  T(F): 98.7 (07 Aug 2021 06:00), Max: 99 (06 Aug 2021 09:05)  HR: 88 (07 Aug 2021 06:00) (78 - 104)  BP: 127/70 (07 Aug 2021 06:00) (99/78 - 133/84)  BP(mean): 84 (07 Aug 2021 06:00) (75 - 95)  RR: 18 (07 Aug 2021 06:00) (12 - 23)  SpO2: 98% (07 Aug 2021 06:00) (98% - 100%)    Labs:  Blood type: O Positive  Rubella IgG: RPR: Negative                          8.3<L>   14.08<H> >-----------< 192    (  @ 05:50 )             25.6<L>                        9.5<L>   15.93<H> >-----------< 190    (  @ 13:33 )             29.4<L>                        11.6   7.04 >-----------< 242    (  @ 06:18 )             36.0                  acetaminophen   Tablet .. 975 milliGRAM(s) Oral every 6 hours  acetaminophen  IVPB .. 1000 milliGRAM(s) IV Intermittent every 6 hours  BUpivacaine 0.5% (Preservative-Free) Injectable 20 milliLiter(s) Local Injection once  BUpivacaine liposome 1.3% Injectable 20 milliLiter(s) Local Injection once  diphenhydrAMINE 25 milliGRAM(s) Oral every 6 hours PRN  diphenoxylate/atropine 2 Tablet(s) Oral once  diphtheria/tetanus/pertussis (acellular) Vaccine (ADAcel) 0.5 milliLiter(s) IntraMuscular once  ferrous    sulfate 325 milliGRAM(s) Oral daily  heparin   Injectable 5000 Unit(s) SubCutaneous every 12 hours  HYDROmorphone  Injectable 1 milliGRAM(s) IV Push every 3 hours PRN  lanolin Ointment 1 Application(s) Topical every 6 hours PRN  magnesium hydroxide Suspension 30 milliLiter(s) Oral two times a day PRN  nalbuphine Injectable 2.5 milliGRAM(s) IV Push every 6 hours PRN  naloxone Injectable 0.1 milliGRAM(s) IV Push every 3 minutes PRN  ondansetron Injectable 4 milliGRAM(s) IV Push every 6 hours PRN  oxyCODONE    IR 5 milliGRAM(s) Oral every 3 hours PRN  oxyCODONE    IR 10 milliGRAM(s) Oral every 3 hours PRN  oxyCODONE    IR 5 milliGRAM(s) Oral every 3 hours PRN  oxyCODONE    IR 5 milliGRAM(s) Oral once PRN  prenatal multivitamin 1 Tablet(s) Oral daily  senna 1 Tablet(s) Oral two times a day PRN  simethicone 80 milliGRAM(s) Chew every 4 hours PRN  tranexamic acid IVPB 1000 milliGRAM(s) IV Intermittent once      PE:  General: NAD  Abdomen: Mildly distended, appropriately tender, incision c/d/i.  Extremities: No erythema, no pitting edema

## 2021-08-07 NOTE — PROGRESS NOTE ADULT - SUBJECTIVE AND OBJECTIVE BOX
OB Progress Note:  Delivery, POD#1    S: 30yyo POD#1 s/p LTCS. Pain controlled. Tolerating a regular diet and passing flatus. Denies n/v, chest pain, shortness of breath,  or lightheadedness/dizziness. Voiding spontaneously and ambulating w/o difficulty    O:   Vital Signs Last 24 Hrs  T(C): 37.1 (07 Aug 2021 06:00), Max: 37.2 (06 Aug 2021 09:05)  T(F): 98.7 (07 Aug 2021 06:00), Max: 99 (06 Aug 2021 09:05)  HR: 88 (07 Aug 2021 06:00) (78 - 104)  BP: 127/70 (07 Aug 2021 06:00) (99/78 - 133/84)  BP(mean): 84 (07 Aug 2021 06:00) (75 - 95)  RR: 18 (07 Aug 2021 06:00) (12 - 23)  SpO2: 98% (07 Aug 2021 06:00) (98% - 100%)    Labs:  Blood type: O Positive  Rubella IgG: RPR: Negative                          8.3<L>   14.08<H> >-----------< 192    (  @ 05:50 )             25.6<L>                        9.5<L>   15.93<H> >-----------< 190    (  @ 13:33 )             29.4<L>                        11.6   7.04 >-----------< 242    (  @ 06:18 )             36.0                  PE:  General: No acute distress  Pulm: Unlabored breathing. No respiratory distress  Abdomen: Soft. Mildly distended. Appropriately tender. incision c/d/i w/ overlying steri-strips/dermabond  Extremities: No pitting edema or calf tenderness bilaterally       Ob/Gyn OB Progress Note:  Delivery, POD#1    S: 29yo POD#1 s/p LTCS. Pain controlled. Tolerating a regular diet and passing flatus. Denies n/v, chest pain, shortness of breath,  or lightheadedness/dizziness. Voiding spontaneously and ambulating w/o difficulty    O:   Vital Signs Last 24 Hrs  T(C): 37.1 (07 Aug 2021 06:00), Max: 37.2 (06 Aug 2021 09:05)  T(F): 98.7 (07 Aug 2021 06:00), Max: 99 (06 Aug 2021 09:05)  HR: 88 (07 Aug 2021 06:00) (78 - 104)  BP: 127/70 (07 Aug 2021 06:00) (99/78 - 133/84)  BP(mean): 84 (07 Aug 2021 06:00) (75 - 95)  RR: 18 (07 Aug 2021 06:00) (12 - 23)  SpO2: 98% (07 Aug 2021 06:00) (98% - 100%)    Labs:  Blood type: O Positive  Rubella IgG: RPR: Negative                          8.3<L>   14.08<H> >-----------< 192    (  @ 05:50 )             25.6<L>                        9.5<L>   15.93<H> >-----------< 190    (  @ 13:33 )             29.4<L>                        11.6   7.04 >-----------< 242    (  @ 06:18 )             36.0              PE:  General: No acute distress  Pulm: Unlabored breathing. No respiratory distress  Abdomen: Soft. Mildly distended. Appropriately tender. incision c/d/i  Extremities: No pitting edema or calf tenderness bilaterally      OB Progress Note:  Delivery, POD#1    S: 31yo POD#1 s/p LTCS c/b atony requiring Methergine, Hemabate, and pre-op TXA. Pain controlled. Tolerating a regular diet and passing flatus. Denies n/v, chest pain, shortness of breath,  or lightheadedness/dizziness. Voiding spontaneously and ambulating w/o difficulty    O:   Vital Signs Last 24 Hrs  T(C): 37.1 (07 Aug 2021 06:00), Max: 37.2 (06 Aug 2021 09:05)  T(F): 98.7 (07 Aug 2021 06:00), Max: 99 (06 Aug 2021 09:05)  HR: 88 (07 Aug 2021 06:00) (78 - 104)  BP: 127/70 (07 Aug 2021 06:00) (99/78 - 133/84)  BP(mean): 84 (07 Aug 2021 06:00) (75 - 95)  RR: 18 (07 Aug 2021 06:00) (12 - 23)  SpO2: 98% (07 Aug 2021 06:00) (98% - 100%)    Labs:  Blood type: O Positive  Rubella IgG: RPR: Negative                          8.3<L>   14.08<H> >-----------< 192    (  @ 05:50 )             25.6<L>                        9.5<L>   15.93<H> >-----------< 190    (  @ 13:33 )             29.4<L>                        11.6   7.04 >-----------< 242    (  @ 06:18 )             36.0              PE:  General: No acute distress  Pulm: Unlabored breathing. No respiratory distress  Abdomen: Soft. Mildly distended. Appropriately tender. incision c/d/i  Extremities: No pitting edema or calf tenderness bilaterally

## 2021-08-08 LAB
BASOPHILS # BLD AUTO: 0.02 K/UL — SIGNIFICANT CHANGE UP (ref 0–0.2)
BASOPHILS NFR BLD AUTO: 0.2 % — SIGNIFICANT CHANGE UP (ref 0–2)
EOSINOPHIL # BLD AUTO: 0.02 K/UL — SIGNIFICANT CHANGE UP (ref 0–0.5)
EOSINOPHIL NFR BLD AUTO: 0.2 % — SIGNIFICANT CHANGE UP (ref 0–6)
HCT VFR BLD CALC: 24.3 % — LOW (ref 34.5–45)
HCT VFR BLD CALC: 27.4 % — LOW (ref 34.5–45)
HGB BLD-MCNC: 7.7 G/DL — LOW (ref 11.5–15.5)
HGB BLD-MCNC: 8.7 G/DL — LOW (ref 11.5–15.5)
IANC: 9.98 K/UL — HIGH (ref 1.5–8.5)
IMM GRANULOCYTES NFR BLD AUTO: 0.7 % — SIGNIFICANT CHANGE UP (ref 0–1.5)
LYMPHOCYTES # BLD AUTO: 1.27 K/UL — SIGNIFICANT CHANGE UP (ref 1–3.3)
LYMPHOCYTES # BLD AUTO: 10.5 % — LOW (ref 13–44)
MCHC RBC-ENTMCNC: 25.7 PG — LOW (ref 27–34)
MCHC RBC-ENTMCNC: 25.9 PG — LOW (ref 27–34)
MCHC RBC-ENTMCNC: 31.7 GM/DL — LOW (ref 32–36)
MCHC RBC-ENTMCNC: 31.8 GM/DL — LOW (ref 32–36)
MCV RBC AUTO: 80.8 FL — SIGNIFICANT CHANGE UP (ref 80–100)
MCV RBC AUTO: 81.8 FL — SIGNIFICANT CHANGE UP (ref 80–100)
MONOCYTES # BLD AUTO: 0.78 K/UL — SIGNIFICANT CHANGE UP (ref 0–0.9)
MONOCYTES NFR BLD AUTO: 6.4 % — SIGNIFICANT CHANGE UP (ref 2–14)
NEUTROPHILS # BLD AUTO: 9.98 K/UL — HIGH (ref 1.8–7.4)
NEUTROPHILS NFR BLD AUTO: 82 % — HIGH (ref 43–77)
NRBC # BLD: 0 /100 WBCS — SIGNIFICANT CHANGE UP
NRBC # BLD: 0 /100 WBCS — SIGNIFICANT CHANGE UP
NRBC # FLD: 0 K/UL — SIGNIFICANT CHANGE UP
NRBC # FLD: 0 K/UL — SIGNIFICANT CHANGE UP
PLATELET # BLD AUTO: 187 K/UL — SIGNIFICANT CHANGE UP (ref 150–400)
PLATELET # BLD AUTO: 219 K/UL — SIGNIFICANT CHANGE UP (ref 150–400)
RBC # BLD: 2.97 M/UL — LOW (ref 3.8–5.2)
RBC # BLD: 3.39 M/UL — LOW (ref 3.8–5.2)
RBC # FLD: 15.3 % — HIGH (ref 10.3–14.5)
RBC # FLD: 15.6 % — HIGH (ref 10.3–14.5)
WBC # BLD: 11.23 K/UL — HIGH (ref 3.8–10.5)
WBC # BLD: 12.15 K/UL — HIGH (ref 3.8–10.5)
WBC # FLD AUTO: 11.23 K/UL — HIGH (ref 3.8–10.5)
WBC # FLD AUTO: 12.15 K/UL — HIGH (ref 3.8–10.5)

## 2021-08-08 PROCEDURE — 74019 RADEX ABDOMEN 2 VIEWS: CPT | Mod: 26

## 2021-08-08 RX ORDER — ONDANSETRON 8 MG/1
4 TABLET, FILM COATED ORAL ONCE
Refills: 0 | Status: COMPLETED | OUTPATIENT
Start: 2021-08-08 | End: 2021-08-08

## 2021-08-08 RX ORDER — SODIUM CHLORIDE 9 MG/ML
1000 INJECTION, SOLUTION INTRAVENOUS
Refills: 0 | Status: DISCONTINUED | OUTPATIENT
Start: 2021-08-08 | End: 2021-08-09

## 2021-08-08 RX ORDER — ASCORBIC ACID 60 MG
500 TABLET,CHEWABLE ORAL DAILY
Refills: 0 | Status: DISCONTINUED | OUTPATIENT
Start: 2021-08-08 | End: 2021-08-09

## 2021-08-08 RX ORDER — ACETAMINOPHEN 500 MG
1000 TABLET ORAL ONCE
Refills: 0 | Status: COMPLETED | OUTPATIENT
Start: 2021-08-08 | End: 2021-08-08

## 2021-08-08 RX ORDER — FERROUS SULFATE 325(65) MG
325 TABLET ORAL THREE TIMES A DAY
Refills: 0 | Status: DISCONTINUED | OUTPATIENT
Start: 2021-08-08 | End: 2021-08-09

## 2021-08-08 RX ADMIN — Medication 975 MILLIGRAM(S): at 09:43

## 2021-08-08 RX ADMIN — Medication 975 MILLIGRAM(S): at 12:15

## 2021-08-08 RX ADMIN — HEPARIN SODIUM 5000 UNIT(S): 5000 INJECTION INTRAVENOUS; SUBCUTANEOUS at 05:20

## 2021-08-08 RX ADMIN — HEPARIN SODIUM 5000 UNIT(S): 5000 INJECTION INTRAVENOUS; SUBCUTANEOUS at 19:33

## 2021-08-08 RX ADMIN — Medication 975 MILLIGRAM(S): at 11:25

## 2021-08-08 RX ADMIN — Medication 975 MILLIGRAM(S): at 03:01

## 2021-08-08 RX ADMIN — Medication 975 MILLIGRAM(S): at 08:38

## 2021-08-08 RX ADMIN — Medication 1000 MILLIGRAM(S): at 19:38

## 2021-08-08 RX ADMIN — Medication 325 MILLIGRAM(S): at 05:20

## 2021-08-08 RX ADMIN — Medication 975 MILLIGRAM(S): at 04:15

## 2021-08-08 RX ADMIN — OXYCODONE HYDROCHLORIDE 5 MILLIGRAM(S): 5 TABLET ORAL at 00:35

## 2021-08-08 RX ADMIN — ONDANSETRON 4 MILLIGRAM(S): 8 TABLET, FILM COATED ORAL at 19:02

## 2021-08-08 RX ADMIN — Medication 400 MILLIGRAM(S): at 19:02

## 2021-08-08 NOTE — CHART NOTE - NSCHARTNOTEFT_GEN_A_CORE
Evaluated patient due to vomiting x 2 on POD #2.  Patient s/p repeat LTCS POD#2. QBL 1205 s/p txa, methergine, pitocin, hemabate.  OBHx: PPH previous pregnancy requiring (2 units of pRBC) gHTN.  Patient evaluated due to having two episodes of vomiting (non-bilious).  Pt felt nauseous shortly after being evaluated by her OB doctor.  Patient denies HA, lightheadedness, dizziness, CP/SOB, n/v.  Patient is ambulating without difficulty . Patient is voiding, not passing flatus but belching, and tolerating PO intake.  Per patient she does not normally pass flatus and she has a bowel movement every 10 days.  Patient requesting for blood transfusion due to trending downward H/H and previous history of  requiring blood transfusion on POD#3.    Vital Signs Last 24 Hrs  T(C): 37.4 (08 Aug 2021 17:20), Max: 37.4 (08 Aug 2021 17:20)  T(F): 99.3 (08 Aug 2021 17:20), Max: 99.3 (08 Aug 2021 17:20)  HR: 106 (08 Aug 2021 17:20) (98 - 106)  BP: 138/83 (08 Aug 2021 17:20) (131/72 - 138/84)  BP(mean): 86 (08 Aug 2021 05:30) (86 - 86)  RR: 18 (08 Aug 2021 17:20) (17 - 18)  SpO2: 100% (08 Aug 2021 17:20) (100% - 100%)                        7.7    11.23 )-----------( 187      ( 08 Aug 2021 06:25 )             24.3     7.7/24.3      Physical Exam:   General: sitting comfortably in bed, NAD, pale skin  CV: RR S1S2   Lungs: CTA b/l, good air flow b/l   Abdomen: fundus firm, non-tender, non distended.     +BS x 4  Incision c/d/i with no erythema   Vaginal: scant vaginal blood on pad.  No active vaginal bleeding  Extremities: non tender b/l, no edema.      Assessment:   Patient is POD#2 w/ 2 episodes of vomiting.     Plan:  - Repeat CBC now if still trending downwards will consider transfusing pRBC.    - Abdominal x-ray to r/o SBO or Illeus  - NPO   - Monitor for Vitals signs  - Instructed patient to ask for assistance with ambulating if she feels dizzy    D/w Dr. Mai (attending)

## 2021-08-08 NOTE — PROGRESS NOTE ADULT - SUBJECTIVE AND OBJECTIVE BOX
OB Postpartum Note: Primary  Delivery, POD#3    S: 31yo now GP POD#3 s/p rLTCS QBL 1205ml receive Preop TXA and Methergine/Pitocin/ and Hemabate due to Uterine atony.  Pt seen and examine at bedside no acute events overnight The patient feels well.  Pain is well controlled. She is tolerating a regular diet and passing flatus. She is voiding spontaneously, and ambulating without difficulty. Denies CP/SOB. Denies lightheadedness/dizziness. Denies N/V.    O:  Vitals:  Vital Signs Last 24 Hrs  T(C): 36.8 (08 Aug 2021 05:30), Max: 37.2 (07 Aug 2021 14:20)  T(F): 98.2 (08 Aug 2021 05:30), Max: 98.9 (07 Aug 2021 14:20)  HR: 100 (08 Aug 2021 05:30) (88 - 101)  BP: 131/72 (08 Aug 2021 05:30) (119/76 - 138/84)  BP(mean): 86 (08 Aug 2021 05:30) (84 - 86)  RR: 17 (08 Aug 2021 05:30) (17 - 18)  SpO2: 100% (08 Aug 2021 05:30) (98% - 100%)    MEDICATIONS  (STANDING):  acetaminophen   Tablet .. 975 milliGRAM(s) Oral every 6 hours  BUpivacaine 0.5% (Preservative-Free) Injectable 20 milliLiter(s) Local Injection once  BUpivacaine liposome 1.3% Injectable 20 milliLiter(s) Local Injection once  diphenoxylate/atropine 2 Tablet(s) Oral once  diphtheria/tetanus/pertussis (acellular) Vaccine (ADAcel) 0.5 milliLiter(s) IntraMuscular once  ferrous    sulfate 325 milliGRAM(s) Oral three times a day  heparin   Injectable 5000 Unit(s) SubCutaneous every 12 hours  prenatal multivitamin 1 Tablet(s) Oral daily  tranexamic acid IVPB 1000 milliGRAM(s) IV Intermittent once    MEDICATIONS  (PRN):  diphenhydrAMINE 25 milliGRAM(s) Oral every 6 hours PRN Pruritus  lanolin Ointment 1 Application(s) Topical every 6 hours PRN Sore Nipples  magnesium hydroxide Suspension 30 milliLiter(s) Oral two times a day PRN Constipation  nalbuphine Injectable 2.5 milliGRAM(s) IV Push every 6 hours PRN Pruritus  naloxone Injectable 0.1 milliGRAM(s) IV Push every 3 minutes PRN For ANY of the following changes in patient status:  A. RR LESS THAN 10 breaths per minute, B. Oxygen saturation LESS THAN 90%, C. Sedation score of 6  ondansetron Injectable 4 milliGRAM(s) IV Push every 6 hours PRN Nausea  oxyCODONE    IR 5 milliGRAM(s) Oral once PRN Moderate to Severe Pain (4-10)  oxyCODONE    IR 5 milliGRAM(s) Oral every 3 hours PRN Moderate to Severe Pain (4-10)  senna 1 Tablet(s) Oral two times a day PRN Constipation  simethicone 80 milliGRAM(s) Chew every 4 hours PRN Gas      LABS:  Blood type: O Positive  Rubella IgG: RPR: Negative                          8.3<L>   14.08<H> >-----------< 192    (  @ 05:50 )             25.6<L>                        9.5<L>   15.93<H> >-----------< 190    (  @ 13:33 )             29.4<L>                        11.6   7.04 >-----------< 242    (  @ 06:18 )             36.0        Physical exam:  Gen: NAD  Lungs: CTA b/l good airway entry  CVS: RRR  S1S2  Abdomen: Soft, nontender, no distension , firm uterine fundus at umbilicus.  Incision: Clean, dry, and intact   Pelvic: Normal lochia noted  Ext: No calf tenderness, no erythema no edema, pedal pulse 2+    A/P: 31yo now  G  P POD#3 s/p pLTCS.  Patient is stable and is doing well post-operatively.  H/H 8.3/25.6<- 9.5/29.4 acute blood loss anemia continue Iron/ Vitamin C/ Folic acid.  - Continue routine Postop/ PP care  - Continue motrin, tylenol, oxycodone PRN for pain control.  - Increase ambulation  - Continue regular diet  - review PEC precaution in detail pt verbalized understanding  - Discharge planning today  - F/u in MD office for 1 wk check    ANNE Cid-BC

## 2021-08-08 NOTE — PROGRESS NOTE ADULT - SUBJECTIVE AND OBJECTIVE BOX
OB Progress Note: LTCS, POD#2    S: 29yo POD#2 s/p LTCS. Pain is well controlled. She is tolerating a regular diet and passing flatus. She is voiding spontaneously, and ambulating without difficulty. Denies CP/SOB. Denies lightheadedness/dizziness. Denies N/V.    O:  Vitals:  Vital Signs Last 24 Hrs  T(C): 36.7 (07 Aug 2021 20:55), Max: 37.2 (07 Aug 2021 14:20)  T(F): 98 (07 Aug 2021 20:55), Max: 98.9 (07 Aug 2021 14:20)  HR: 98 (07 Aug 2021 20:55) (88 - 101)  BP: 138/84 (07 Aug 2021 20:55) (119/76 - 138/84)  BP(mean): 84 (07 Aug 2021 06:00) (84 - 84)  RR: 17 (07 Aug 2021 20:55) (17 - 18)  SpO2: 100% (07 Aug 2021 20:55) (98% - 100%)    MEDICATIONS  (STANDING):  acetaminophen   Tablet .. 975 milliGRAM(s) Oral every 6 hours  BUpivacaine 0.5% (Preservative-Free) Injectable 20 milliLiter(s) Local Injection once  BUpivacaine liposome 1.3% Injectable 20 milliLiter(s) Local Injection once  diphenoxylate/atropine 2 Tablet(s) Oral once  diphtheria/tetanus/pertussis (acellular) Vaccine (ADAcel) 0.5 milliLiter(s) IntraMuscular once  ferrous    sulfate 325 milliGRAM(s) Oral three times a day  heparin   Injectable 5000 Unit(s) SubCutaneous every 12 hours  prenatal multivitamin 1 Tablet(s) Oral daily  tranexamic acid IVPB 1000 milliGRAM(s) IV Intermittent once      MEDICATIONS  (PRN):  diphenhydrAMINE 25 milliGRAM(s) Oral every 6 hours PRN Pruritus  lanolin Ointment 1 Application(s) Topical every 6 hours PRN Sore Nipples  magnesium hydroxide Suspension 30 milliLiter(s) Oral two times a day PRN Constipation  nalbuphine Injectable 2.5 milliGRAM(s) IV Push every 6 hours PRN Pruritus  naloxone Injectable 0.1 milliGRAM(s) IV Push every 3 minutes PRN For ANY of the following changes in patient status:  A. RR LESS THAN 10 breaths per minute, B. Oxygen saturation LESS THAN 90%, C. Sedation score of 6  ondansetron Injectable 4 milliGRAM(s) IV Push every 6 hours PRN Nausea  oxyCODONE    IR 5 milliGRAM(s) Oral once PRN Moderate to Severe Pain (4-10)  oxyCODONE    IR 5 milliGRAM(s) Oral every 3 hours PRN Moderate to Severe Pain (4-10)  senna 1 Tablet(s) Oral two times a day PRN Constipation  simethicone 80 milliGRAM(s) Chew every 4 hours PRN Gas      Labs:  Blood type: O Positive  Rubella IgG: RPR: Negative                          8.3<L>   14.08<H> >-----------< 192    ( 08-07 @ 05:50 )             25.6<L>                        9.5<L>   15.93<H> >-----------< 190    ( 08-06 @ 13:33 )             29.4<L>                        11.6   7.04 >-----------< 242    ( 08-06 @ 06:18 )             36.0                  PE:  General: NAD  Abdomen: Soft, appropriately tender, incision c/d/i.  Extremities: No erythema, no pitting edema

## 2021-08-08 NOTE — PROGRESS NOTE ADULT - ASSESSMENT
A/P: 29yo POD#2 s/p LTCS.  Patient is stable and doing well post-operatively.    - Continue regular diet.  - Increase ambulation.  - Continue motrin, tylenol, oxycodone PRN for pain control  - Discharge planning    SARAH Modi PGY-1

## 2021-08-08 NOTE — LACTATION INITIAL EVALUATION - LACTATION INTERVENTIONS
Primary RN made aware of consult and plan./initiate/review safe skin-to-skin/initiate/review hand expression/initiate/review pumping guidelines and safe milk handling/initiate/review techniques for position and latch/post discharge community resources provided/initiate/review supplementation plan due to medical indications/review techniques to increase milk supply/review techniques to manage sore nipples/engorgement/initiate/review breast massage/compression/initiate/review alternate feeding method/reviewed components of an effective feeding and at least 8 effective feedings per day required/reviewed importance of monitoring infant diapers, the breastfeeding log, and minimum output each day/reviewed risks of unnecessary formula supplementation/reviewed benefits and recommendations for rooming in/reviewed feeding on demand/by cue at least 8 times a day/recommended follow-up with pediatrician within 24 hours of discharge/reviewed indications of inadequate milk transfer that would require supplementation

## 2021-08-09 VITALS
SYSTOLIC BLOOD PRESSURE: 116 MMHG | HEART RATE: 100 BPM | RESPIRATION RATE: 18 BRPM | TEMPERATURE: 98 F | DIASTOLIC BLOOD PRESSURE: 69 MMHG | OXYGEN SATURATION: 99 %

## 2021-08-09 LAB
HCT VFR BLD CALC: 25.7 % — LOW (ref 34.5–45)
HGB BLD-MCNC: 8.2 G/DL — LOW (ref 11.5–15.5)
MCHC RBC-ENTMCNC: 26 PG — LOW (ref 27–34)
MCHC RBC-ENTMCNC: 31.9 GM/DL — LOW (ref 32–36)
MCV RBC AUTO: 81.6 FL — SIGNIFICANT CHANGE UP (ref 80–100)
NRBC # BLD: 0 /100 WBCS — SIGNIFICANT CHANGE UP
NRBC # FLD: 0 K/UL — SIGNIFICANT CHANGE UP
PLATELET # BLD AUTO: 235 K/UL — SIGNIFICANT CHANGE UP (ref 150–400)
RBC # BLD: 3.15 M/UL — LOW (ref 3.8–5.2)
RBC # FLD: 15.4 % — HIGH (ref 10.3–14.5)
WBC # BLD: 11.79 K/UL — HIGH (ref 3.8–10.5)
WBC # FLD AUTO: 11.79 K/UL — HIGH (ref 3.8–10.5)

## 2021-08-09 RX ORDER — ASCORBIC ACID 60 MG
1 TABLET,CHEWABLE ORAL
Qty: 0 | Refills: 0 | DISCHARGE
Start: 2021-08-09

## 2021-08-09 RX ORDER — FERROUS SULFATE 325(65) MG
1 TABLET ORAL
Qty: 0 | Refills: 0 | DISCHARGE
Start: 2021-08-09

## 2021-08-09 RX ADMIN — HEPARIN SODIUM 5000 UNIT(S): 5000 INJECTION INTRAVENOUS; SUBCUTANEOUS at 06:48

## 2021-08-09 NOTE — PROGRESS NOTE ADULT - SUBJECTIVE AND OBJECTIVE BOX
OB Postpartum Note:  Delivery, POD#3    S: 31yo POD#3 s/p LTCS. Over yesterday, the pt had x3 episodes of emesis with attempts at PO. Was made NPO and abdominal KUB was obtained. Prelim read demonstrates ileus. Pt denies any more episodes of n/v after being made NPO and being started on IVF. Pain is well controlled. Last passed flatus on . Currently denies any n/v. She is voiding spontaneously, and ambulating without difficulty. Denies CP/SOB. Denies lightheadedness/dizziness.     O:  Vitals:  Vital Signs Last 24 Hrs  T(C): 36.9 (09 Aug 2021 01:25), Max: 37.4 (08 Aug 2021 17:20)  T(F): 98.4 (09 Aug 2021 01:25), Max: 99.3 (08 Aug 2021 17:20)  HR: 99 (09 Aug 2021 01:25) (99 - 106)  BP: 137/67 (09 Aug 2021 01:25) (131/72 - 138/83)  BP(mean): 86 (08 Aug 2021 05:30) (86 - 86)  RR: 17 (09 Aug 2021 01:25) (17 - 18)  SpO2: 97% (09 Aug 2021 01:25) (97% - 100%)    MEDICATIONS  (STANDING):  ascorbic acid 500 milliGRAM(s) Oral daily  diphenoxylate/atropine 2 Tablet(s) Oral once  diphtheria/tetanus/pertussis (acellular) Vaccine (ADAcel) 0.5 milliLiter(s) IntraMuscular once  ferrous    sulfate 325 milliGRAM(s) Oral three times a day  heparin   Injectable 5000 Unit(s) SubCutaneous every 12 hours  lactated ringers. 1000 milliLiter(s) (125 mL/Hr) IV Continuous <Continuous>  prenatal multivitamin 1 Tablet(s) Oral daily  tranexamic acid IVPB 1000 milliGRAM(s) IV Intermittent once    MEDICATIONS  (PRN):  diphenhydrAMINE 25 milliGRAM(s) Oral every 6 hours PRN Pruritus  lanolin Ointment 1 Application(s) Topical every 6 hours PRN Sore Nipples  magnesium hydroxide Suspension 30 milliLiter(s) Oral two times a day PRN Constipation  oxyCODONE    IR 5 milliGRAM(s) Oral every 3 hours PRN Moderate to Severe Pain (4-10)  oxyCODONE    IR 5 milliGRAM(s) Oral once PRN Moderate to Severe Pain (4-10)  senna 1 Tablet(s) Oral two times a day PRN Constipation  simethicone 80 milliGRAM(s) Chew every 4 hours PRN Gas      LABS:  Blood type: O Positive  Rubella IgG: RPR: Negative                          8.7<L>   12.15<H> >-----------< 219    (  @ 17:50 )             27.4<L>                        7.7<L>   11.23<H> >-----------< 187    (  @ 06:25 )             24.3<L>                        8.3<L>   14.08<H> >-----------< 192    (  @ 05:50 )             25.6<L>                        9.5<L>   15.93<H> >-----------< 190    (  @ 13:33 )             29.4<L>                        11.6   7.04 >-----------< 242    (  @ 06:18 )             36.0                  Physical exam:  Gen: NAD  Abdomen: Soft, nontender, mildly distended   Incision: Clean, dry, and intact   Ext: No calf tenderness bilaterally           OB Postpartum Note:  Delivery, POD#3    S: 31yo POD#3 s/p LTCS. Over yesterday, the pt had x3 episodes of emesis with attempts at PO. Was made NPO and abdominal KUB was obtained. Prelim read demonstrates ileus. Pt denies any more episodes of n/v after being made NPO and being started on IVF. Pain is well controlled. Last passed flatus on . Currently denies any n/v. She is voiding spontaneously, and ambulating without difficulty. Denies CP/SOB. Denies lightheadedness/dizziness.     O:  Vitals:  Vital Signs Last 24 Hrs  T(C): 36.9 (09 Aug 2021 01:25), Max: 37.4 (08 Aug 2021 17:20)  T(F): 98.4 (09 Aug 2021 01:25), Max: 99.3 (08 Aug 2021 17:20)  HR: 99 (09 Aug 2021 01:25) (99 - 106)  BP: 137/67 (09 Aug 2021 01:25) (131/72 - 138/83)  BP(mean): 86 (08 Aug 2021 05:30) (86 - 86)  RR: 17 (09 Aug 2021 01:25) (17 - 18)  SpO2: 97% (09 Aug 2021 01:25) (97% - 100%)    MEDICATIONS  (STANDING):  ascorbic acid 500 milliGRAM(s) Oral daily  diphenoxylate/atropine 2 Tablet(s) Oral once  diphtheria/tetanus/pertussis (acellular) Vaccine (ADAcel) 0.5 milliLiter(s) IntraMuscular once  ferrous    sulfate 325 milliGRAM(s) Oral three times a day  heparin   Injectable 5000 Unit(s) SubCutaneous every 12 hours  lactated ringers. 1000 milliLiter(s) (125 mL/Hr) IV Continuous <Continuous>  prenatal multivitamin 1 Tablet(s) Oral daily  tranexamic acid IVPB 1000 milliGRAM(s) IV Intermittent once    MEDICATIONS  (PRN):  diphenhydrAMINE 25 milliGRAM(s) Oral every 6 hours PRN Pruritus  lanolin Ointment 1 Application(s) Topical every 6 hours PRN Sore Nipples  magnesium hydroxide Suspension 30 milliLiter(s) Oral two times a day PRN Constipation  oxyCODONE    IR 5 milliGRAM(s) Oral every 3 hours PRN Moderate to Severe Pain (4-10)  oxyCODONE    IR 5 milliGRAM(s) Oral once PRN Moderate to Severe Pain (4-10)  senna 1 Tablet(s) Oral two times a day PRN Constipation  simethicone 80 milliGRAM(s) Chew every 4 hours PRN Gas      LABS:  Blood type: O Positive  Rubella IgG: RPR: Negative                          8.7<L>   12.15<H> >-----------< 219    (  @ 17:50 )             27.4<L>                        7.7<L>   11.23<H> >-----------< 187    (  @ 06:25 )             24.3<L>                        8.3<L>   14.08<H> >-----------< 192    (  @ 05:50 )             25.6<L>                        9.5<L>   15.93<H> >-----------< 190    (  @ 13:33 )             29.4<L>                        11.6   7.04 >-----------< 242    (  @ 06:18 )             36.0                  Physical exam:  Gen: NAD  Abdomen: Soft, nontender, mildly distended. BS present    Incision: Clean, dry, and intact   Ext: No calf tenderness bilaterally

## 2021-08-09 NOTE — PROGRESS NOTE ADULT - ATTENDING COMMENTS
cbc to be drawn tomorrow morning   if stable d/c 8/9/21
Abdomen: +BS, Soft, mildly distended, tympanic, tenderness appropriate, uterus firm  Incision: clean, dry, and intact  GYN: Lochia normal    POD # 3 s/p R'C/S complicated by PPH and post-op ileus. Patient had three episodes of emesis yesterday and was made NPO. This morning patient reports that she is very thirsty. She has been passing flatus, had a small bowel movement, and her abdomen has decreased in size. Patient reports that she wants to home and became very tearful.    - Clear diet - can advance as tolerated  - Continue postpartum care  - Out of bed with ambulation  - Discussed possible discharge tonight if patient can tolerate a regular diet - Patient reports understanding and agreed
Patient seen at bedside. agree with A & P above. DC home tomorrow

## 2021-08-09 NOTE — PROGRESS NOTE ADULT - ASSESSMENT
A/P: 29yo POD#3 s/p LTCS.  Patient is stable and is doing well post-operatively.  - Continue motrin, tylenol, oxycodone PRN for pain control.  - Increase ambulation  - Continue NPO w/ IVF until formal read  - Discharge planning    Harpreet Trevino, PGY-1  Obstetrics and Gynecology   A/P: 29yo POD#3 s/p LTCS.  Patient is stable and is doing well post-operatively.  - Continue motrin, tylenol, oxycodone PRN for pain control.  - Increase ambulation  - Continue NPO w/ IVF until formal read. Currently w/o n/v      Harpreet Trevino, PGY-1  Obstetrics and Gynecology

## 2022-08-09 NOTE — OB RN PATIENT PROFILE - ABILITY TO HEAR (WITH HEARING AID OR HEARING APPLIANCE IF NORMALLY USED):
Adequate: hears normal conversation without difficulty Erythromycin Pregnancy And Lactation Text: This medication is Pregnancy Category B and is considered safe during pregnancy. It is also excreted in breast milk.

## 2023-11-03 NOTE — OB PROVIDER DELIVERY SUMMARY - NSCSDICTATIONPROV_OBGYN_ALL_OB_FT
Last office visit 8/15/2023     Last written      Next office visit scheduled 11/14/2023    Requested Prescriptions     Pending Prescriptions Disp Refills    ALPRAZolam (XANAX) 1 MG tablet [Pharmacy Med Name: ALPRAZOLAM 1MG TABLETS] 90 tablet      Sig: TAKE 1 TABLET BY MOUTH THREE TIMES DAILY AS NEEDED FOR ANXIETY OR SLEEP Sruthi Israel CNM

## 2025-06-24 NOTE — OB RN PATIENT PROFILE - AS SC BRADEN SENSORY
35w1d  No complaints.  Feeling well.  Normal FM.    Intermittent ctx's.   Current pregnancy complications noted at last US in April.   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 The LVOT appeared narrowed in some views.  - Hypoplastic nasal bone.  - Two vessel umbilical cord.  - Early onset fetal growth restriction.  Patient has declined any US follow-up since mid April.     Patient has not agreed to any of the follow-up recommended for these issues but agrees to a follow-up US for growth.  Lina made that appt in clinic today while I was with her. The US will be scheduled for next week.    NST done in clinic today due to FGR.   EFM:   125 baseline, moderate variablility, + accels,one < 20 sec variable decel, Category I     But there is a 22 min stretch of minimal variability followed by a reactive NST with moderate variability.   Overall there is a reactive NST.    Patient stated her baby is fine and not going to die.    discussed plan for IOL and the soonest she will agree to IOL would be ~ 39 wks.    Patience Hogan MD   
(4) no impairment